# Patient Record
Sex: MALE | Race: WHITE | Employment: FULL TIME | URBAN - METROPOLITAN AREA
[De-identification: names, ages, dates, MRNs, and addresses within clinical notes are randomized per-mention and may not be internally consistent; named-entity substitution may affect disease eponyms.]

---

## 2017-03-29 ENCOUNTER — APPOINTMENT (OUTPATIENT)
Dept: GENERAL RADIOLOGY | Facility: HOSPITAL | Age: 62
End: 2017-03-29
Attending: EMERGENCY MEDICINE
Payer: COMMERCIAL

## 2017-03-29 ENCOUNTER — HOSPITAL ENCOUNTER (OUTPATIENT)
Facility: HOSPITAL | Age: 62
Setting detail: OBSERVATION
Discharge: HOME | End: 2017-03-30
Attending: EMERGENCY MEDICINE | Admitting: HOSPITALIST
Payer: COMMERCIAL

## 2017-03-29 DIAGNOSIS — E87.6 HYPOKALEMIA: ICD-10-CM

## 2017-03-29 DIAGNOSIS — R07.89 CHEST PAIN, ATYPICAL: Primary | ICD-10-CM

## 2017-03-29 PROCEDURE — 85730 THROMBOPLASTIN TIME PARTIAL: CPT | Performed by: EMERGENCY MEDICINE

## 2017-03-29 PROCEDURE — 80061 LIPID PANEL: CPT | Performed by: EMERGENCY MEDICINE

## 2017-03-29 PROCEDURE — 99285 EMERGENCY DEPT VISIT HI MDM: CPT

## 2017-03-29 PROCEDURE — 82550 ASSAY OF CK (CPK): CPT | Performed by: HOSPITALIST

## 2017-03-29 PROCEDURE — 80053 COMPREHEN METABOLIC PANEL: CPT | Performed by: EMERGENCY MEDICINE

## 2017-03-29 PROCEDURE — 85025 COMPLETE CBC W/AUTO DIFF WBC: CPT | Performed by: EMERGENCY MEDICINE

## 2017-03-29 PROCEDURE — 84484 ASSAY OF TROPONIN QUANT: CPT | Performed by: EMERGENCY MEDICINE

## 2017-03-29 PROCEDURE — 93005 ELECTROCARDIOGRAM TRACING: CPT

## 2017-03-29 PROCEDURE — 84484 ASSAY OF TROPONIN QUANT: CPT | Performed by: HOSPITALIST

## 2017-03-29 PROCEDURE — 83735 ASSAY OF MAGNESIUM: CPT | Performed by: EMERGENCY MEDICINE

## 2017-03-29 PROCEDURE — 71010 XR CHEST AP PORTABLE  (CPT=71010): CPT

## 2017-03-29 PROCEDURE — 85610 PROTHROMBIN TIME: CPT | Performed by: EMERGENCY MEDICINE

## 2017-03-29 PROCEDURE — 36415 COLL VENOUS BLD VENIPUNCTURE: CPT

## 2017-03-29 PROCEDURE — 93010 ELECTROCARDIOGRAM REPORT: CPT

## 2017-03-29 PROCEDURE — 93010 ELECTROCARDIOGRAM REPORT: CPT | Performed by: INTERNAL MEDICINE

## 2017-03-29 PROCEDURE — 85378 FIBRIN DEGRADE SEMIQUANT: CPT | Performed by: HOSPITALIST

## 2017-03-29 RX ORDER — ACETAMINOPHEN 325 MG/1
650 TABLET ORAL EVERY 6 HOURS PRN
Status: DISCONTINUED | OUTPATIENT
Start: 2017-03-29 | End: 2017-03-30

## 2017-03-29 RX ORDER — ASPIRIN 81 MG/1
81 TABLET ORAL DAILY
Status: DISCONTINUED | OUTPATIENT
Start: 2017-03-30 | End: 2017-03-30

## 2017-03-29 RX ORDER — OLMESARTAN MEDOXOMIL AND HYDROCHLOROTHIAZIDE 40/25 40; 25 MG/1; MG/1
1 TABLET ORAL DAILY
Status: DISCONTINUED | OUTPATIENT
Start: 2017-03-30 | End: 2017-03-29

## 2017-03-29 RX ORDER — ASPIRIN 81 MG/1
81 TABLET ORAL DAILY
COMMUNITY

## 2017-03-29 RX ORDER — ASPIRIN 81 MG/1
324 TABLET, CHEWABLE ORAL ONCE
Status: COMPLETED | OUTPATIENT
Start: 2017-03-29 | End: 2017-03-29

## 2017-03-29 RX ORDER — POTASSIUM CHLORIDE 20 MEQ/1
40 TABLET, EXTENDED RELEASE ORAL EVERY 4 HOURS
Status: COMPLETED | OUTPATIENT
Start: 2017-03-29 | End: 2017-03-30

## 2017-03-29 RX ORDER — SODIUM CHLORIDE 9 MG/ML
INJECTION, SOLUTION INTRAVENOUS CONTINUOUS
Status: ACTIVE | OUTPATIENT
Start: 2017-03-29 | End: 2017-03-30

## 2017-03-29 RX ORDER — SODIUM CHLORIDE 9 MG/ML
INJECTION, SOLUTION INTRAVENOUS CONTINUOUS
Status: DISCONTINUED | OUTPATIENT
Start: 2017-03-29 | End: 2017-03-29

## 2017-03-29 RX ORDER — OLMESARTAN MEDOXOMIL AND HYDROCHLOROTHIAZIDE 40/25 40; 25 MG/1; MG/1
1 TABLET ORAL DAILY
Status: ON HOLD | COMMUNITY
End: 2017-03-30

## 2017-03-29 RX ORDER — POTASSIUM CHLORIDE 20 MEQ/1
40 TABLET, EXTENDED RELEASE ORAL ONCE
Status: COMPLETED | OUTPATIENT
Start: 2017-03-29 | End: 2017-03-29

## 2017-03-29 RX ORDER — HYDRALAZINE HYDROCHLORIDE 25 MG/1
25 TABLET, FILM COATED ORAL EVERY 6 HOURS PRN
Status: DISCONTINUED | OUTPATIENT
Start: 2017-03-29 | End: 2017-03-30

## 2017-03-29 NOTE — ED INITIAL ASSESSMENT (HPI)
Pt states he has not been feeling well for about a week. Last night his back began to give him pain but pain did not radiate. Approximately  Minutes PTA pt started having chest pain that radiates to this shoulders and states felt light headed.  Denies N/V/s

## 2017-03-29 NOTE — ED PROVIDER NOTES
Patient Seen in: [facility] Emergency Department    History   Patient presents with:  Chest Pain Angina (cardiovascular)    Stated Complaint: CP    HPI    Patient is a pleasant 62-year-old male, presenting for evaluation after experiencing an episod GENERAL: Patient is awake and alert, resting comfortably on the cart, in no apparent distress. HEENT: Head is without evidence of trauma. Extraocular muscles are intact. Pupils are equal and reactive to light.  Oropharynx is pink and moist.  NECK: Neck -----------         ------                     CBC W/ DIFFERENTIAL[[id number]]          Abnormal            Final result                 Please view results for these tests on the individual orders.    MAGNESIUM   D-DIMER   RAINBOW DRAW on the patient's clinical course. Patient and family verbalized understanding and are comfortable with the plan as recommended. Remainder of the patient's emergency room stay was without incident.     Disposition and Plan     Clinical Impression:  Chest

## 2017-03-30 ENCOUNTER — APPOINTMENT (OUTPATIENT)
Dept: CV DIAGNOSTICS | Facility: HOSPITAL | Age: 62
End: 2017-03-30
Attending: INTERNAL MEDICINE
Payer: COMMERCIAL

## 2017-03-30 VITALS
RESPIRATION RATE: 18 BRPM | DIASTOLIC BLOOD PRESSURE: 85 MMHG | TEMPERATURE: 98 F | OXYGEN SATURATION: 97 % | SYSTOLIC BLOOD PRESSURE: 151 MMHG | BODY MASS INDEX: 29.92 KG/M2 | WEIGHT: 209 LBS | HEART RATE: 95 BPM | HEIGHT: 70 IN

## 2017-03-30 PROCEDURE — 93018 CV STRESS TEST I&R ONLY: CPT | Performed by: INTERNAL MEDICINE

## 2017-03-30 PROCEDURE — 84484 ASSAY OF TROPONIN QUANT: CPT | Performed by: HOSPITALIST

## 2017-03-30 PROCEDURE — 80048 BASIC METABOLIC PNL TOTAL CA: CPT | Performed by: HOSPITALIST

## 2017-03-30 PROCEDURE — 93005 ELECTROCARDIOGRAM TRACING: CPT

## 2017-03-30 PROCEDURE — 93017 CV STRESS TEST TRACING ONLY: CPT

## 2017-03-30 PROCEDURE — 93010 ELECTROCARDIOGRAM REPORT: CPT | Performed by: INTERNAL MEDICINE

## 2017-03-30 PROCEDURE — 84132 ASSAY OF SERUM POTASSIUM: CPT | Performed by: HOSPITALIST

## 2017-03-30 PROCEDURE — 78452 HT MUSCLE IMAGE SPECT MULT: CPT

## 2017-03-30 RX ORDER — OLMESARTAN MEDOXOMIL 40 MG/1
40 TABLET ORAL DAILY
Qty: 30 TABLET | Refills: 0 | Status: SHIPPED | OUTPATIENT
Start: 2017-03-30

## 2017-03-30 RX ORDER — LOSARTAN POTASSIUM 50 MG/1
50 TABLET ORAL DAILY
Status: DISCONTINUED | OUTPATIENT
Start: 2017-03-30 | End: 2017-03-30

## 2017-03-30 NOTE — PROGRESS NOTES
Explained discharge instructions including medications and follow ups to the patient, verbalize understanding, IV removed, tele monitor discontinued, transported via wheelchair.

## 2017-03-30 NOTE — PROGRESS NOTES
03/29/17 2017 03/29/17 2020 03/29/17 2023   Vital Signs   Pulse 83 75 78   Heart Rate Source Monitor --  --    Resp 18 --  --    Respiratory Quality Normal --  --    /77 mmHg 153/82 mmHg 153/90 mmHg   BP Location Left arm Left arm Left arm   BP Me

## 2017-03-30 NOTE — PROGRESS NOTES
Preliminary Cardiac Diagnostic Note:    No ekg changes noted after pt walked 10:31 on drew protocol for nuclear stress test(ach hr 144--90% apmhr)  Pt denied cardiac symptoms  No arrhythmias  nuc pending

## 2017-03-30 NOTE — PLAN OF CARE
2140 walking past room and patient c/o chest heaviness through the shoulders and neck, diaphoretic, shaky, anxious. /84, P 96, R 18.

## 2017-03-30 NOTE — H&P
[facility] hospitalist H+P    PCP;Kit Kenney MD  CC chest pain,     HPI 61 yo male with hx of HTN, anxiety presented for evaluation of chest/upper back and upper arms discomfort that occurred this afternoon. He felt low energy but denies dizziness.  symp plt 209  Troponin <0.046  Total cholesterol 177, , DHL 46, LDL 86  D-dimer 0.35  K 2.8  BUN 17, creatinine 1.31    EKG: normal sinus rhythm, nonspecific ST changes  CXR (personally reviewed result in EPIC) no acute pulmonary findings      Assessment/

## 2017-03-30 NOTE — CONSULTS
[facility] Cardiology Consultation 3/30/2017      [address] Patient Status:  Observation    1955 MRN [id number]   [facility] 8NE-A Attending Antonella Griffiths MD   Hosp Day # 1 PCP Radu Gonzales MD     Initial Impress smoked. He has never used smokeless tobacco. He reports that he does not drink alcohol or use illicit drugs. He works as a banker at a local bank  Medications:  • aspirin EC  81 mg Oral Daily       Continuous Infusions:        Allergies:  No Known Allerg 106*       Recent Labs   Lab  03/29/17   1602   ALT  30   AST  13*   ALB  4.0       Recent Labs   Lab  03/29/17   1602   PTP  13.2   INR  1.00       Recent Labs   Lab  03/29/17   1602  03/29/17   2235  03/30/17   0440   TROP  <0.046  <0.046  <0.046   CK  8

## 2017-03-30 NOTE — PROGRESS NOTES
At 2239, Patient c/o left upper chest tightness, radiates across upper back 8/10, /94. Denies SOB. ST on tele. Pain subsided down to 6/10 at 2248. Tylenol PRN given, /84, NSR. Patient reports that pain is resolving.  Awaiting for 2nd troponin re

## 2017-03-30 NOTE — PLAN OF CARE
CARDIOVASCULAR - ADULT    • Maintains optimal cardiac output and hemodynamic stability Progressing    • Absence of cardiac arrhythmias or at baseline Progressing        METABOLIC/FLUID AND ELECTROLYTES - ADULT    • Electrolytes maintained within normal mcnulty

## 2017-03-30 NOTE — PLAN OF CARE
NURSING ADMISSION NOTE      Patient admitted via cart from ER  Oriented to room. Safety precautions initiated. Bed in low position. Call light in reach.   Updated history and pta meds gave report to  CTU 8 RN

## 2017-03-30 NOTE — PAYOR COMM NOTE
Attending Physician: Shabnam Casillas MD    Review Type: ADMISSION   Reviewer: Amalia Amaya       Date: March 30, 2017 - 9:25 AM  Payor: CINDI TOUSSAINT  Authorization Number: [id number]  Admit date: 3/29/2017  3:59 PM   Admitted from Emergency Dept.: yes  REVIEWER COM Jyotsna Reynolds RN      Potassium Chloride ER (K-DUR M20) CR tab 40 mEq     Date Action Dose Route User    3/30/2017 0201 Given 40 mEq Oral Tatiana Cockayne, RN    3/29/2017 2208 Given 40 mEq Oral Tatiana Cockayne, RN      0.9%  NaCl infusion WITH DIFFERENTIAL WITH PLATELET.   Procedure                               Abnormality         Status                     ---------                               -----------         ------                     CBC W/ DIFFERENTIAL[[id number]]          Abnormal

## 2017-04-01 NOTE — DISCHARGE SUMMARY
[facility]  Discharge Summary    [address] [facility] Patient Status:  Observation    1955 MRN [id number]   [facility] 8NE-A Attending No att. providers found   Hosp Day # 1 PCP Irving Wright MD     Date of Admission: 3/29/2017 diet    Anxiety; per patient he is under stress but the symptoms did not feel like panic attack    Discharge when ok with cardiology    Consultations: cardiology    Procedures: CXR,  stress test    Complications: please see EPIC    Disposition: Home or Jes

## 2019-08-07 PROBLEM — D12.0 BENIGN NEOPLASM OF CECUM: Status: ACTIVE | Noted: 2019-08-07

## 2019-08-07 PROBLEM — D12.2 BENIGN NEOPLASM OF ASCENDING COLON: Status: ACTIVE | Noted: 2019-08-07

## 2019-08-07 PROBLEM — Z86.010 PERSONAL HISTORY OF COLONIC POLYPS: Status: ACTIVE | Noted: 2019-08-07

## 2019-08-07 PROBLEM — D12.3 BENIGN NEOPLASM OF TRANSVERSE COLON: Status: ACTIVE | Noted: 2019-08-07

## 2021-04-21 ENCOUNTER — IMMUNIZATION (OUTPATIENT)
Dept: LAB | Age: 66
End: 2021-04-21
Attending: HOSPITALIST
Payer: COMMERCIAL

## 2021-04-21 DIAGNOSIS — Z23 NEED FOR VACCINATION: Primary | ICD-10-CM

## 2021-04-21 PROCEDURE — 0001A SARSCOV2 VAC 30MCG/0.3ML IM: CPT

## 2021-05-12 ENCOUNTER — IMMUNIZATION (OUTPATIENT)
Dept: LAB | Age: 66
End: 2021-05-12
Attending: HOSPITALIST
Payer: COMMERCIAL

## 2021-05-12 DIAGNOSIS — Z23 NEED FOR VACCINATION: Primary | ICD-10-CM

## 2021-05-12 PROCEDURE — 0002A SARSCOV2 VAC 30MCG/0.3ML IM: CPT

## 2021-12-16 ENCOUNTER — IMMUNIZATION (OUTPATIENT)
Dept: LAB | Facility: HOSPITAL | Age: 66
End: 2021-12-16
Attending: EMERGENCY MEDICINE
Payer: COMMERCIAL

## 2021-12-16 DIAGNOSIS — Z23 NEED FOR VACCINATION: Primary | ICD-10-CM

## 2021-12-16 PROCEDURE — 0004A SARSCOV2 VAC 30MCG/0.3ML IM: CPT

## (undated) NOTE — IP AVS SNAPSHOT
[facility] [place]  [address] [place], [address] ~ [phone]                Discharge Summary   3/29/2017    [address] [place] [address]           Admission Information        Provider Department    3/29/2017 Jhoana Haile MD  8ne-A Please seek medical attention if your symptoms do not improve or get worse    Please check blood pressure twice daily, keep record and bring to the appointment with Dr. Brent Jackson MD   Please seek medical attention for systolic blood pressure less th (03/29/17)  66.2 (03/29/17)  23.1 (03/29/17)  8.5 (03/29/17)  1.4 (03/29/17)  0.4 -- (03/29/17)  7.48 (H) (03/29/17)  2.61 (03/29/17)  0.96 (H) (03/29/17)  0.16 (03/29/17)  9.89      Metabolic Lab Results  (Last result in the past 90 days)    HgbA1C Gluco visit,  view other health information, and more. To sign up or find more information, go to [email or web address] and click on the Sign Up Now link in the [employer] box.      Enter your [employer] Activation Code exactly as it appears below along with yo What to report to your healthcare team: Unusual bleeding, abdominal pain, dark, loose bowel movements           Non-Narcotic Pain Medications     aspirin EC 81 MG Oral Tab EC       Use: Treat pain, fever, inflammation   Most common side effects: Stomach up